# Patient Record
Sex: FEMALE | Race: BLACK OR AFRICAN AMERICAN | NOT HISPANIC OR LATINO | URBAN - METROPOLITAN AREA
[De-identification: names, ages, dates, MRNs, and addresses within clinical notes are randomized per-mention and may not be internally consistent; named-entity substitution may affect disease eponyms.]

---

## 2024-12-02 ENCOUNTER — OFFICE VISIT (OUTPATIENT)
Dept: URGENT CARE | Age: 19
End: 2024-12-02
Payer: MEDICAID

## 2024-12-02 VITALS
HEART RATE: 88 BPM | TEMPERATURE: 97.7 F | DIASTOLIC BLOOD PRESSURE: 68 MMHG | OXYGEN SATURATION: 98 % | SYSTOLIC BLOOD PRESSURE: 102 MMHG | RESPIRATION RATE: 16 BRPM | WEIGHT: 280 LBS

## 2024-12-02 DIAGNOSIS — J06.9 UPPER RESPIRATORY TRACT INFECTION, UNSPECIFIED TYPE: ICD-10-CM

## 2024-12-02 DIAGNOSIS — J02.9 VIRAL PHARYNGITIS: ICD-10-CM

## 2024-12-02 DIAGNOSIS — J02.9 SORE THROAT: Primary | ICD-10-CM

## 2024-12-02 LAB — POC RAPID STREP: NEGATIVE

## 2024-12-02 PROCEDURE — 87651 STREP A DNA AMP PROBE: CPT

## 2024-12-02 RX ORDER — BROMPHENIRAMINE MALEATE, PSEUDOEPHEDRINE HYDROCHLORIDE, AND DEXTROMETHORPHAN HYDROBROMIDE 2; 30; 10 MG/5ML; MG/5ML; MG/5ML
10 SYRUP ORAL 4 TIMES DAILY PRN
Qty: 250 ML | Refills: 0 | Status: SHIPPED | OUTPATIENT
Start: 2024-12-02 | End: 2024-12-12

## 2024-12-02 ASSESSMENT — PAIN SCALES - GENERAL: PAINLEVEL_OUTOF10: 5

## 2024-12-02 ASSESSMENT — ENCOUNTER SYMPTOMS: SORE THROAT: 1

## 2024-12-02 NOTE — LETTER
December 2, 2024     Patient: Sandy Burgess   YOB: 2005   Date of Visit: 12/2/2024       To Whom It May Concern:    It is my medical opinion that Sandy Burgess may return to work on 12/3/2024 .    If you have any questions or concerns, please don't hesitate to call.         Sincerely,        Sherman Drummond PA-C    CC: No Recipients

## 2024-12-02 NOTE — PROGRESS NOTES
Subjective   Patient ID: Sandy Burgess is a 19 y.o. female. They present today with a chief complaint of Sore Throat (Swollen tonsils).    History of Present Illness  Patient is a pleasant 19-year-old -American female, no significant past medical history, presented to clinic with chief complaint of sore throat.  Patient is reporting 2-day history of sore throat with some associated upper respiratory congestion rhinorrhea.  Denies any cough or sputum production.  No fever or chills.  No chest pain or shortness of breath.  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  No abdominal pain, nausea, vomiting.  No rashes.  No myalgias, arthralgias, generalized weakness, fatigue, numbness, tingling, or focal weakness.      Sore Throat         Past Medical History  Allergies as of 12/02/2024    (No Known Allergies)       (Not in a hospital admission)         No past medical history on file.    No past surgical history on file.     reports that she has never smoked. She has never used smokeless tobacco.    Review of Systems  Review of Systems   HENT:  Positive for sore throat.                                   Objective    Vitals:    12/02/24 1726   BP: 102/68   Pulse: 88   Resp: 16   Temp: 36.5 °C (97.7 °F)   TempSrc: Oral   SpO2: 98%   Weight: 127 kg (280 lb)     Patient's last menstrual period was 11/16/2024 (approximate).    Physical Exam  General: Vitals Noted. No distress. Normocephalic.     HEENT: TMs normal, EOMI, normal conjunctiva, patent nares with erythematous edematous and appear nasal turbinates and clear rhinorrhea bilaterally.  Posterior oropharynx with signs of postnasal drainage without any erythema swelling or tonsillar exudate.  Uvula is in the midline and nonedematous.  No drooling.  No trismus.    Neck: Supple with no adenopathy.     Cardiac: Regular Rate and Rhythm. No murmur.     Pulmonary: Equal breath sounds bilaterally. No wheezes, rhonchi, or rales.    Abdomen: Soft, non-tender, with normal bowel sounds.      Musculoskeletal: Moves all extremities, no effusion, no edema.     Skin: No obvious rashes.    Procedures    Point of Care Test & Imaging Results from this visit  Results for orders placed or performed in visit on 12/02/24   POCT rapid strep A manually resulted    Collection Time: 12/02/24  5:41 PM   Result Value Ref Range    POC Rapid Strep Negative Negative      No results found.    Diagnostic study results (if any) were reviewed by Sherman Drummond PA-C.    Assessment/Plan   Allergies, medications, history, and pertinent labs/EKGs/Imaging reviewed by Sherman Drummond PA-C.     Medical Decision Making  Patient was seen eval in the clinic with complaint of sore throat.  On exam patient is nontoxic very well-appearing resting bed comfortably no acute distress.  Vital signs are stable, afebrile.  Chest is clear, heart is regular, belly soft and nontender.  ENT exam as above concerning for a upper respiratory infection.  I feel patient strep throat is viral in nature.  Rapid strep was performed and negative.  We will send culture.  Advised use Tylenol or Profen every 6 hours for pain relief will provide prescription for Bromfed-DM to be used as needed for her congestion.  Reviewed my impression, plan, strict return versus report to ED precautions with the patient.  She expresses understanding and agreement plan of care.    Orders and Diagnoses  Diagnoses and all orders for this visit:  Sore throat  -     POCT rapid strep A manually resulted        Medical Admin Record      Follow Up Instructions  No follow-ups on file.    Patient disposition: Home    Electronically signed by Shemran Drummond PA-C  5:45 PM

## 2024-12-03 LAB — S PYO DNA THROAT QL NAA+PROBE: NOT DETECTED

## 2024-12-04 ENCOUNTER — OFFICE VISIT (OUTPATIENT)
Dept: URGENT CARE | Age: 19
End: 2024-12-04
Payer: MEDICAID

## 2024-12-04 VITALS
TEMPERATURE: 98.1 F | WEIGHT: 280 LBS | HEART RATE: 90 BPM | OXYGEN SATURATION: 98 % | SYSTOLIC BLOOD PRESSURE: 104 MMHG | DIASTOLIC BLOOD PRESSURE: 72 MMHG | RESPIRATION RATE: 16 BRPM

## 2024-12-04 DIAGNOSIS — H10.31 ACUTE BACTERIAL CONJUNCTIVITIS OF RIGHT EYE: Primary | ICD-10-CM

## 2024-12-04 RX ORDER — POLYMYXIN B SULFATE AND TRIMETHOPRIM 1; 10000 MG/ML; [USP'U]/ML
1 SOLUTION OPHTHALMIC EVERY 6 HOURS
Qty: 10 ML | Refills: 0 | Status: SHIPPED | OUTPATIENT
Start: 2024-12-04 | End: 2024-12-11

## 2024-12-04 ASSESSMENT — PAIN SCALES - GENERAL: PAINLEVEL_OUTOF10: 0-NO PAIN

## 2024-12-05 NOTE — PROGRESS NOTES
Subjective   Patient ID: Sandy Burgess is a 19 y.o. female. They present today with a chief complaint of Eye Problem (Itchy red right eye x 2 days).    History of Present Illness  Patient reports 2 days of right eye symptoms  Redness  Endorses pus  Notes eyelids stuck together  Denies photophobia  Denies injury  Denies vision loss  Denies allergies to meds    Past Medical History  Allergies as of 12/04/2024    (No Known Allergies)       (Not in a hospital admission)       No past medical history on file.    No past surgical history on file.     reports that she has never smoked. She has never used smokeless tobacco.                               Objective    Vitals:    12/04/24 1920   BP: 104/72   Pulse: 90   Resp: 16   Temp: 36.7 °C (98.1 °F)   TempSrc: Oral   SpO2: 98%   Weight: 127 kg (280 lb)     Patient's last menstrual period was 11/16/2024 (approximate).    Physical Exam  Constitutional:       General: She is not in acute distress.     Appearance: Normal appearance. She is not toxic-appearing or diaphoretic.   HENT:      Nose: No rhinorrhea.   Eyes:      General: No scleral icterus.        Right eye: Discharge present.         Left eye: No discharge.      Extraocular Movements: Extraocular movements intact.      Right eye: Normal extraocular motion and no nystagmus.      Left eye: Normal extraocular motion and no nystagmus.      Comments: Conjunctival erythema present   Pulmonary:      Effort: Pulmonary effort is normal.   Musculoskeletal:      Cervical back: Normal range of motion.   Neurological:      Mental Status: She is alert.   Psychiatric:         Mood and Affect: Mood normal.         Behavior: Behavior normal.         Thought Content: Thought content normal.      Comments: Pleasant         Procedures    Point of Care Test & Imaging Results from this visit:  Results for orders placed or performed in visit on 12/02/24   POCT rapid strep A manually resulted    Collection Time: 12/02/24  5:41 PM    Result Value Ref Range    POC Rapid Strep Negative Negative   Group A Streptococcus, PCR    Collection Time: 12/02/24  6:16 PM    Specimen: Throat/Pharynx; Swab   Result Value Ref Range    Group A Strep PCR Not Detected Not Detected       Diagnostic study results (if any) were reviewed by Yusef Silva MD.    Assessment/Plan   Allergies, medications, history, and pertinent labs/EKGs/Imaging reviewed by Yusef Silva MD.     Medical Decision Making:    Patient symptoms are most consistent with bacterial conjunctivitis vs less likely viral conjunctivitis. Encourage washcloth compresses. Order antibiotic drop. Return as needed.    Orders and Diagnoses  Diagnoses and all orders for this visit:  Acute bacterial conjunctivitis of right eye  -     polymyxin B sulf-trimethoprim (Polytrim) ophthalmic solution; Administer 1 drop into the right eye every 6 hours for 7 days.      Patient disposition: Home      Medical Admin Record      Follow Up Instructions  No follow-ups on file.    Electronically signed by Yusef Silva MD  9:59 PM